# Patient Record
Sex: MALE | Race: WHITE | NOT HISPANIC OR LATINO | Employment: STUDENT | URBAN - NONMETROPOLITAN AREA
[De-identification: names, ages, dates, MRNs, and addresses within clinical notes are randomized per-mention and may not be internally consistent; named-entity substitution may affect disease eponyms.]

---
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
## 2025-01-09 ENCOUNTER — OFFICE VISIT (OUTPATIENT)
Dept: FAMILY MEDICINE | Facility: OTHER | Age: 20
End: 2025-01-09
Attending: NURSE PRACTITIONER
Payer: COMMERCIAL

## 2025-01-09 VITALS
WEIGHT: 269.2 LBS | SYSTOLIC BLOOD PRESSURE: 124 MMHG | TEMPERATURE: 98.3 F | BODY MASS INDEX: 44.85 KG/M2 | DIASTOLIC BLOOD PRESSURE: 64 MMHG | OXYGEN SATURATION: 95 % | HEIGHT: 65 IN | HEART RATE: 94 BPM

## 2025-01-09 DIAGNOSIS — Z00.00 ROUTINE GENERAL MEDICAL EXAMINATION AT A HEALTH CARE FACILITY: Primary | ICD-10-CM

## 2025-01-09 DIAGNOSIS — E03.9 ACQUIRED HYPOTHYROIDISM: ICD-10-CM

## 2025-01-09 DIAGNOSIS — Z23 NEED FOR VACCINATION: ICD-10-CM

## 2025-01-09 DIAGNOSIS — Q90.9 DOWN SYNDROME: ICD-10-CM

## 2025-01-09 DIAGNOSIS — L01.00 IMPETIGO: ICD-10-CM

## 2025-01-09 DIAGNOSIS — Z76.89 ENCOUNTER TO ESTABLISH CARE: ICD-10-CM

## 2025-01-09 PROCEDURE — G0463 HOSPITAL OUTPT CLINIC VISIT: HCPCS

## 2025-01-09 RX ORDER — MUPIROCIN 20 MG/G
OINTMENT TOPICAL 3 TIMES DAILY
Qty: 30 G | Refills: 0 | Status: SHIPPED | OUTPATIENT
Start: 2025-01-09 | End: 2025-01-16

## 2025-01-09 SDOH — HEALTH STABILITY: PHYSICAL HEALTH: ON AVERAGE, HOW MANY DAYS PER WEEK DO YOU ENGAGE IN MODERATE TO STRENUOUS EXERCISE (LIKE A BRISK WALK)?: 0 DAYS

## 2025-01-09 ASSESSMENT — PAIN SCALES - GENERAL: PAINLEVEL_OUTOF10: NO PAIN (0)

## 2025-01-09 ASSESSMENT — SOCIAL DETERMINANTS OF HEALTH (SDOH): HOW OFTEN DO YOU GET TOGETHER WITH FRIENDS OR RELATIVES?: TWICE A WEEK

## 2025-01-09 NOTE — PROGRESS NOTES
"Preventive Care Visit  RANGE FRANCOISE Thomas CNP, Family Medicine  Jan 9, 2025      Assessment & Plan     Duane is here to establish care today. He is with his step mom and aunt. Duane is doing well. He will answer simple answers. He is pleasant and follows most commands. Duane has downs syndrome.     (Z00.00) Routine general medical examination at a health care facility  (primary encounter diagnosis)  Comment: Duane hasn't been taking Synthroid as he hasn't been able to get the suspension form and that is the only way he will take it. I ordered synthroid suspension. Check labs in 6 weeks and he will come fasting   Plan: levothyroxine (SYNTHROID) 25 mcg/mL SUSP, Lipid        Profile (Chol, Trig, HDL, LDL calc), TSH with         free T4 reflex, Comprehensive metabolic panel,         CBC with platelets            (E03.9) Acquired hypothyroidism  Comment: Duane will only take synthroid suspension. He will not take synthroid in any other form. Suspension order to [facility] pharmacy   Plan: levothyroxine (SYNTHROID) 25 mcg/mL SUSP            (L01.00) Impetigo  Comment: Open area with erythema to mid chin with a yellow discharge. He continue to pick at it    Plan: mupirocin (BACTROBAN) 2 % external ointment      (Z23) Need for vaccination  Comment: Men B vaccine today   Plan: As above     (Z76.89) Encounter to establish care  Comment: Reviewed past medical, surgical and family history with gina. Medications reviewed. Care established   Plan: As above       (Q90.9) Down syndrome  Comment: Follows simple commands. Makes needs known   Plan: As above         BMI  Estimated body mass index is 44.85 kg/m  as calculated from the following:    Height as of this encounter: 1.65 m (5' 4.96\").    Weight as of this encounter: 122.1 kg (269 lb 3.2 oz).   Weight management plan: Discussed healthy diet and exercise guidelines    Counseling  Appropriate preventive services were addressed with this patient via " screening, questionnaire, or discussion as appropriate for fall prevention, nutrition, physical activity, Tobacco-use cessation, social engagement, weight loss and cognition.  Checklist reviewing preventive services available has been given to the patient.  Reviewed patient's diet, addressing concerns and/or questions.       See Patient Instructions    Return in about 53 weeks (around 1/15/2026) for Annual Wellness Visit.    Gage Zepeda is a 19 year old, presenting for the following:  Physical        1/9/2025     1:11 PM   Additional Questions   Roomed by Heaven VÁSQUEZ   Accompanied by gina Corrales and aunmaria eugenia BORGES    Hypothyroidism Follow-up  Since last visit, patient describes the following symptoms: weight loss of 27 lbs and dry skin. He hasn't been snacking as much         1/9/2025   General Health   How would you rate your overall physical health? (!) FAIR   Feel stress (tense, anxious, or unable to sleep) Not at all         1/9/2025   Nutrition   Three or more servings of calcium each day? (!) NO   Diet: Regular (no restrictions)   How many servings of fruit and vegetables per day? (!) 0-1   How many sweetened beverages each day? (!) 3         1/9/2025   Exercise   Days per week of moderate/strenous exercise 0 days   (!) EXERCISE CONCERN      1/9/2025   Social Factors   Frequency of gathering with friends or relatives Twice a week   Worry food won't last until get money to buy more No   Food not last or not have enough money for food? No   Do you have housing? (Housing is defined as stable permanent housing and does not include staying ouside in a car, in a tent, in an abandoned building, in an overnight shelter, or couch-surfing.) Yes   Are you worried about losing your housing? No   Lack of transportation? No   Unable to get utilities (heat,electricity)? No         1/9/2025   Dental   Dentist two times every year? Yes         1/9/2025   TB Screening   Were you born outside of the [place]? No  "        Today's PHQ-2 Score:       1/9/2025     1:00 PM   PHQ-2 ( 1999 [employer])   Q1: Little interest or pleasure in doing things 0   Q2: Feeling down, depressed or hopeless 0   PHQ-2 Score 0    Q1: Little interest or pleasure in doing things Not at all   Q2: Feeling down, depressed or hopeless Not at all   PHQ-2 Score 0       Patient-reported           1/9/2025   Substance Use   Alcohol more than 3/day or more than 7/wk Not Applicable   Do you use any other substances recreationally? No     Social History     Tobacco Use    Smoking status: Never    Smokeless tobacco: Never   Vaping Use    Vaping status: Never Used   Substance Use Topics    Alcohol use: Never     Alcohol/week: 0.0 standard drinks of alcohol    Drug use: Never           1/9/2025   One time HIV Screening   Previous HIV test? No         1/9/2025   STI Screening   New sexual partner(s) since last STI/HIV test? No         1/9/2025   Contraception/Family Planning   Questions about contraception or family planning No       Reviewed and updated as needed this visit by Provider     Meds                  Review of Systems  Unable to review with developmental delay and down's syndrome. No concerns per step mom. Duane is mostly non verbal.      Objective    Exam  /64 (BP Location: Right arm, Patient Position: Sitting, Cuff Size: Adult Large)   Pulse 94   Temp 98.3  F (36.8  C) (Tympanic)   Ht 1.65 m (5' 4.96\")   Wt 122.1 kg (269 lb 3.2 oz)   SpO2 95%   BMI 44.85 kg/m     Estimated body mass index is 44.85 kg/m  as calculated from the following:    Height as of this encounter: 1.65 m (5' 4.96\").    Weight as of this encounter: 122.1 kg (269 lb 3.2 oz).    Physical Exam  GENERAL: alert and no distress  EYES: Eyes grossly normal to inspection, PERRL and conjunctivae and sclerae normal  HENT: ear canals and TM's normal, nose and mouth without ulcers or lesions  NECK: no adenopathy, no asymmetry, masses, or scars  RESP: lungs clear to auscultation - no " rales, rhonchi or wheezes  CV: regular rate and rhythm, normal S1 S2, no S3 or S4, no murmur, click or rub, no peripheral edema  ABDOMEN: soft, nontender, no hepatosplenomegaly, no masses and bowel sounds normal   (male): Declined exam   MS: no gross musculoskeletal defects noted, no edema  SKIN: no suspicious lesions or rashes. +Open area with erythema to mid chin with a yellow discharge  NEURO: Normal strength and tone, mentation intact and speech normal  PSYCH: mentation appears normal, affect normal/bright  : Exam declined by parent/patient. Reason for decline: Duane doesn't want to be examined. HX of buried penis       Vision Screen  Vision Screen Details  Reason Vision Screen Not Completed: Screening Recommend: Patient/Guardian Declined    Hearing Screen  Hearing Screen Not Completed  Reason Hearing Screen was not completed: Parent declined - No concerns      Signed Electronically by: FRANCOISE Carpio CNP

## 2025-01-09 NOTE — PATIENT INSTRUCTIONS
Patient Education   Preventive Care Advice   This is general advice given by our system to help you stay healthy. However, your care team may have specific advice just for you. Please talk to your care team about your preventive care needs.  Nutrition  Eat 5 or more servings of fruits and vegetables each day.  Try wheat bread, brown rice and whole grain pasta (instead of white bread, rice, and pasta).  Get enough calcium and vitamin D. Check the label on foods and aim for 100% of the RDA (recommended daily allowance).  Lifestyle  Exercise at least 150 minutes each week  (30 minutes a day, 5 days a week).  Do muscle strengthening activities 2 days a week. These help control your weight and prevent disease.  No smoking.  Wear sunscreen to prevent skin cancer.  Have a dental exam and cleaning every 6 months.  Yearly exams  See your health care team every year to talk about:  Any changes in your health.  Any medicines your care team has prescribed.  Preventive care, family planning, and ways to prevent chronic diseases.  Shots (vaccines)   HPV shots (up to age 26), if you've never had them before.  Hepatitis B shots (up to age 59), if you've never had them before.  COVID-19 shot: Get this shot when it's due.  Flu shot: Get a flu shot every year.  Tetanus shot: Get a tetanus shot every 10 years.  Pneumococcal, hepatitis A, and RSV shots: Ask your care team if you need these based on your risk.  Shingles shot (for age 50 and up)  General health tests  Diabetes screening:  Starting at age 35, Get screened for diabetes at least every 3 years.  If you are younger than age 35, ask your care team if you should be screened for diabetes.  Cholesterol test: At age 39, start having a cholesterol test every 5 years, or more often if advised.  Bone density scan (DEXA): At age 50, ask your care team if you should have this scan for osteoporosis (brittle bones).  Hepatitis C: Get tested at least once in your life.  STIs (sexually  transmitted infections)  Before age 24: Ask your care team if you should be screened for STIs.  After age 24: Get screened for STIs if you're at risk. You are at risk for STIs (including HIV) if:  You are sexually active with more than one person.  You don't use condoms every time.  You or a partner was diagnosed with a sexually transmitted infection.  If you are at risk for HIV, ask about PrEP medicine to prevent HIV.  Get tested for HIV at least once in your life, whether you are at risk for HIV or not.  Cancer screening tests  Cervical cancer screening: If you have a cervix, begin getting regular cervical cancer screening tests starting at age 21.  Breast cancer scan (mammogram): If you've ever had breasts, begin having regular mammograms starting at age 40. This is a scan to check for breast cancer.  Colon cancer screening: It is important to start screening for colon cancer at age 45.  Have a colonoscopy test every 10 years (or more often if you're at risk) Or, ask your provider about stool tests like a FIT test every year or Cologuard test every 3 years.  To learn more about your testing options, visit:   .  For help making a decision, visit:   https://bit.ly/ad48961.  Prostate cancer screening test: If you have a prostate, ask your care team if a prostate cancer screening test (PSA) at age 55 is right for you.  Lung cancer screening: If you are a current or former smoker ages 50 to 80, ask your care team if ongoing lung cancer screenings are right for you.  For informational purposes only. Not to replace the advice of your health care provider. Copyright   2023 [employer]. All rights reserved. Clinically reviewed by the [facility] Transitions Program. [employer] [id number] - REV 01/24.

## 2025-01-23 DIAGNOSIS — Z00.00 ROUTINE GENERAL MEDICAL EXAMINATION AT A HEALTH CARE FACILITY: ICD-10-CM

## 2025-01-23 DIAGNOSIS — E03.9 ACQUIRED HYPOTHYROIDISM: ICD-10-CM

## 2025-01-24 DIAGNOSIS — E03.9 ACQUIRED HYPOTHYROIDISM: ICD-10-CM

## 2025-01-24 DIAGNOSIS — Z00.00 ROUTINE GENERAL MEDICAL EXAMINATION AT A HEALTH CARE FACILITY: ICD-10-CM

## 2025-01-24 NOTE — TELEPHONE ENCOUNTER
I called gina Corrales, and she stated that the liquid tirosint is the stuff that [facility]ta will not take. She would like the medication sent to a specialty pharmacy instead.

## 2025-01-24 NOTE — TELEPHONE ENCOUNTER
I would be fine with that as long as the dose is comparable. Please make sure Magdalene is fine with that prior to ordering it. The other option is having specialty pharmacy send it to them. I am happy to order it on how they want.

## 2025-01-24 NOTE — TELEPHONE ENCOUNTER
Judie called and stated they cannot compound patient's synthroid and are wondering if you want to do the liquid levothyroxine Tirosint? Otherwise needs to be sent to a specialty pharmacy.

## 2025-04-09 ENCOUNTER — TELEPHONE (OUTPATIENT)
Dept: FAMILY MEDICINE | Facility: OTHER | Age: 20
End: 2025-04-09

## 2025-04-09 NOTE — PROGRESS NOTES
"  Assessment & Plan       (H65.191) Other acute nonsuppurative otitis media of right ear, recurrence not specified  (primary encounter diagnosis)  Comment: Right middle ear infection. Treat with Amoxicillin. Supportive care discussed   Plan: amoxicillin (AMOXIL) 400 MG/5ML suspension            (H92.01) Right ear pain  Comment: Treat with Tylenol and/or ibuprofen. Supportive care discussed   Plan: acetaminophen (TYLENOL) 160 MG/5ML solution,         ibuprofen (ADVIL/MOTRIN) 100 MG/5ML suspension              See Patient Instructions    Return if symptoms worsen or fail to improve.    Gage Zepeda is a 19 year old, presenting for the following health issues:  Cold Symptoms and Otalgia        4/10/2025    10:01 AM   Additional Questions   Roomed by Heaven VÁSQUEZ   Accompanied by austin Corrales and Aunt     History of Present Illness       Reason for visit:  Cough ear pain  Symptom onset:  1-3 days ago   He is taking medications regularly.        Acute Illness  Acute illness concerns: tugging on ears and sinus issues (nasal congestion)   Onset/Duration: x  2 days   Symptoms:  Fever: No  Chills/Sweats: No  Headache (location?): N/A  Sinus Pressure: N/A  Conjunctivitis:  No  Ear Pain: possibly- he was tugging on them  Rhinorrhea: YES  Congestion: YES  Sore Throat: N/A  Cough: YES  Wheeze: YES  Decreased Appetite: YES  Nausea: No  Vomiting: No  Diarrhea: No  Dysuria/Freq.: No  Dysuria or Hematuria: No  Fatigue/Achiness: YES  Sick/Strep Exposure: YES- stepmom is sick  Therapies tried and outcome: cough medication- doesn't take it well, cough drop- didn't like them          Review of Systems  Mostly non verbal. HX down syndrome and developmental delay. Step mom and PCA (aunt) provided information.       Objective    /64 (BP Location: Right arm, Patient Position: Sitting, Cuff Size: Adult Large)   Pulse 89   Temp 97.6  F (36.4  C) (Tympanic)   Ht 1.65 m (5' 4.96\")   Wt 118.3 kg (260 lb 12.8 oz)   SpO2 94%   BMI " 43.45 kg/m    Body mass index is 43.45 kg/m .  Physical Exam   GENERAL: alert and no distress  HENT: left ear canal and TM normal, nose and mouth without ulcers or lesions. +right middle ear with erythema with bulge to TM with TM intact   NECK: no adenopathy, no asymmetry, masses, or scars  RESP: lungs clear to auscultation - no rales, rhonchi or wheezes  CV: regular rate and rhythm, normal S1 S2, no S3 or S4, no murmur, click or rub, no peripheral edema  MS: no gross musculoskeletal defects noted, no edema  SKIN: no suspicious lesions or rashes  NEURO: Normal strength and tone, mentation intact and speech normal  PSYCH: mentation appears normal, affect normal/bright        Signed Electronically by: FRANCOISE Carpio CNP

## 2025-04-09 NOTE — TELEPHONE ENCOUNTER
12:41 PM    Reason for Call: OVERBOOK    Patient is having the following symptoms: Possible sinus and ear infections, possible strep for 2 days.     The patient is requesting an appointment tomorrow 4/10 with Sara Haq.    Was an appointment offered for this call No    Preferred method for responding to this message: Telephone Call  What is your phone number [phone]    If we cannot reach you directly, may we leave a detailed response at the number you provided? Yes      Odessa Garcia

## 2025-04-10 ENCOUNTER — OFFICE VISIT (OUTPATIENT)
Dept: FAMILY MEDICINE | Facility: OTHER | Age: 20
End: 2025-04-10
Attending: NURSE PRACTITIONER
Payer: COMMERCIAL

## 2025-04-10 VITALS
OXYGEN SATURATION: 94 % | WEIGHT: 260.8 LBS | DIASTOLIC BLOOD PRESSURE: 64 MMHG | HEIGHT: 65 IN | HEART RATE: 89 BPM | BODY MASS INDEX: 43.45 KG/M2 | TEMPERATURE: 97.6 F | SYSTOLIC BLOOD PRESSURE: 108 MMHG

## 2025-04-10 DIAGNOSIS — H65.191 OTHER ACUTE NONSUPPURATIVE OTITIS MEDIA OF RIGHT EAR, RECURRENCE NOT SPECIFIED: Primary | ICD-10-CM

## 2025-04-10 DIAGNOSIS — H92.01 RIGHT EAR PAIN: ICD-10-CM

## 2025-04-10 PROCEDURE — G0463 HOSPITAL OUTPT CLINIC VISIT: HCPCS

## 2025-04-10 RX ORDER — ACETAMINOPHEN 160 MG/5ML
1000 LIQUID ORAL EVERY 8 HOURS PRN
Qty: 473 ML | Refills: 0 | Status: SHIPPED | OUTPATIENT
Start: 2025-04-10

## 2025-04-10 RX ORDER — AMOXICILLIN 400 MG/5ML
875 POWDER, FOR SUSPENSION ORAL 2 TIMES DAILY
Qty: 218.8 ML | Refills: 0 | Status: SHIPPED | OUTPATIENT
Start: 2025-04-10 | End: 2025-04-20

## 2025-04-10 RX ORDER — IBUPROFEN 100 MG/5ML
800 SUSPENSION ORAL EVERY 8 HOURS PRN
Qty: 473 ML | Refills: 0 | Status: SHIPPED | OUTPATIENT
Start: 2025-04-10